# Patient Record
Sex: FEMALE | Race: WHITE | NOT HISPANIC OR LATINO | Employment: FULL TIME | URBAN - METROPOLITAN AREA
[De-identification: names, ages, dates, MRNs, and addresses within clinical notes are randomized per-mention and may not be internally consistent; named-entity substitution may affect disease eponyms.]

---

## 2019-10-02 ENCOUNTER — TRANSCRIBE ORDERS (OUTPATIENT)
Dept: ADMINISTRATIVE | Facility: HOSPITAL | Age: 29
End: 2019-10-02

## 2019-10-02 DIAGNOSIS — Z12.31 VISIT FOR SCREENING MAMMOGRAM: Primary | ICD-10-CM

## 2019-10-22 ENCOUNTER — TELEPHONE (OUTPATIENT)
Dept: MAMMOGRAPHY | Facility: HOSPITAL | Age: 29
End: 2019-10-22

## 2019-10-22 NOTE — TELEPHONE ENCOUNTER
Patient on the mammogram schedule at [facility] on 10/23 for a screening mammogram. The technologist noticed that the patient was 29 year old.  I spoke to the radiologist concerning the patient with a family history of maternal aunt at the age of 38 diagnosed with breast cancer. Per the radiologist the patient does not need a mammogram first.  The patient should speak to a breast specialist, get a risk profile, possible see a .

## 2019-10-23 ENCOUNTER — HOSPITAL ENCOUNTER (OUTPATIENT)
Dept: MAMMOGRAPHY | Facility: HOSPITAL | Age: 29
End: 2019-10-23

## 2020-03-03 ENCOUNTER — OFFICE VISIT (OUTPATIENT)
Dept: FAMILY MEDICINE CLINIC | Facility: CLINIC | Age: 30
End: 2020-03-03

## 2020-03-03 VITALS
DIASTOLIC BLOOD PRESSURE: 68 MMHG | BODY MASS INDEX: 24.62 KG/M2 | HEIGHT: 64 IN | WEIGHT: 144.2 LBS | SYSTOLIC BLOOD PRESSURE: 106 MMHG | HEART RATE: 68 BPM | OXYGEN SATURATION: 100 % | TEMPERATURE: 98.2 F

## 2020-03-03 DIAGNOSIS — J32.9 SINUSITIS, UNSPECIFIED CHRONICITY, UNSPECIFIED LOCATION: ICD-10-CM

## 2020-03-03 DIAGNOSIS — G43.819 OTHER MIGRAINE WITHOUT STATUS MIGRAINOSUS, INTRACTABLE: Primary | ICD-10-CM

## 2020-03-03 DIAGNOSIS — K92.1 BLOOD IN STOOL: ICD-10-CM

## 2020-03-03 DIAGNOSIS — Z00.00 ROUTINE GENERAL MEDICAL EXAMINATION AT A HEALTH CARE FACILITY: ICD-10-CM

## 2020-03-03 PROBLEM — G43.909 MIGRAINE: Status: ACTIVE | Noted: 2020-03-03

## 2020-03-03 PROCEDURE — 99385 PREV VISIT NEW AGE 18-39: CPT | Performed by: NURSE PRACTITIONER

## 2020-03-03 PROCEDURE — 99214 OFFICE O/P EST MOD 30 MIN: CPT | Performed by: NURSE PRACTITIONER

## 2020-03-03 RX ORDER — AMITRIPTYLINE HYDROCHLORIDE 10 MG/1
TABLET, FILM COATED ORAL
COMMUNITY
Start: 2020-02-09

## 2020-03-03 NOTE — PROGRESS NOTES
"Subjective   Salma Pierre is a 29 y.o. female.     History of Present Illness   New patient to me today.    Well Adult Physical: Patient here for a comprehensive physical exam.The patient reports migraines  Do you take any herbs or supplements that were not prescribed by a doctor? no Are you taking calcium supplements? no Are you taking aspirin daily? no    Patient suffers from migraines typically she has 3 out of 4 weeks every month where she is suffering.  Typically amitriptyline 10 mg does work but does not work anymore and has not for the past 3 months.  She is here for possible referral to neurology or any other medication that would possibly work.  In the past she is tried Topamax which did not help.    Pt suffers from frequent sinus infections and requesting a referral to ENT.    Also she has noticed that sometimes when she has a bowel movement she has a considerable amount of blood in the toilet and on the toilet paper this has been happening 1-2 times a month for the past 6 months at least.  She is not had any night sweats or weight loss there is no history of colon cancer in her family that she is aware of.    The following portions of the patient's history were reviewed and updated as appropriate: allergies, current medications, past social history and problem list.    Review of Systems   Neurological: Positive for headaches.   All other systems reviewed and are negative.      Objective   /68 (BP Location: Left arm, Patient Position: Sitting)   Pulse 68   Temp 98.2 °F (36.8 °C)   Ht 162.6 cm (64\")   Wt 65.4 kg (144 lb 3.2 oz)   SpO2 100%   BMI 24.75 kg/m²   Physical Exam   Constitutional: She is oriented to person, place, and time. Vital signs are normal. She appears well-developed and well-nourished. No distress.   HENT:   Head: Normocephalic.   Cardiovascular: Normal rate, regular rhythm and normal heart sounds.   Pulmonary/Chest: Effort normal and breath sounds normal.   Neurological: She " is alert and oriented to person, place, and time. Gait normal.   Psychiatric: She has a normal mood and affect. Her behavior is normal. Judgment and thought content normal.   Vitals reviewed.      Assessment/Plan      Diagnosis Plan   1. Other migraine without status migrainosus, intractable  Erenumab-aooe (AIMOVIG) 70 MG/ML prefilled syringe   2. Sinusitis, unspecified chronicity, unspecified location  Ambulatory Referral to ENT (Otolaryngology)   3. Routine general medical examination at a health care facility     4. Blood in stool  Ambulatory Referral to Gastroenterology     Referral to gastroenterologist for blood in stool referral to ENT for sinus issues.  Flory try Aimovig for her migraine she is to return to the office if there is no improvement.  I verbalized to the patient that she could possibly get a bill for this visit considering she wanted to physical along with these 3 problems patient verbalized understanding and still stated that she wanted a physical today.  Discussed weight, diet and exercise  Follow up after labs      THEODORE Mathews  3/3/2020

## 2020-03-05 ENCOUNTER — PRIOR AUTHORIZATION (OUTPATIENT)
Dept: FAMILY MEDICINE CLINIC | Facility: CLINIC | Age: 30
End: 2020-03-05

## 2020-03-18 ENCOUNTER — TELEPHONE (OUTPATIENT)
Dept: FAMILY MEDICINE CLINIC | Facility: CLINIC | Age: 30
End: 2020-03-18

## 2020-03-18 NOTE — TELEPHONE ENCOUNTER
Pt states she has been home for a couple of days with SOB (she states she has asthma so she feels like this is related), she feels she has a fever, also has congestion and migraine. Her  is in the [employer] and has been sent home from work due to her symptoms, they will not allow him at work. She was asking if she could be tested for COVID-19 but I have asked the screening q's and she does not have cough or been out of country or been in contact with anyone known/being tested for it so I informed her she does not qualify for test but she is curious what August would advise her to do?

## 2020-03-18 NOTE — TELEPHONE ENCOUNTER
Salma is curious if she can get a work excuse quarantine note. Her  will need to take it to his employer since he has been exposed in order for him to stay home with her and not be penalized? please advise.

## 2020-03-18 NOTE — TELEPHONE ENCOUNTER
I would advise her to stay home quarantine herself for 2 weeks and treat it like a virus.  Rest take any medications for any type of symptoms that you have over-the-counter push her fluids and eat well

## 2020-03-20 ENCOUNTER — PREP FOR SURGERY (OUTPATIENT)
Dept: OTHER | Facility: HOSPITAL | Age: 30
End: 2020-03-20

## 2020-03-20 DIAGNOSIS — K62.5 RECTAL BLEEDING: Primary | ICD-10-CM

## 2020-05-20 ENCOUNTER — TELEPHONE (OUTPATIENT)
Dept: FAMILY MEDICINE CLINIC | Facility: CLINIC | Age: 30
End: 2020-05-20

## 2020-05-20 NOTE — TELEPHONE ENCOUNTER
DID THE OFFICE RECEIVE THE APPEAL THAT WAS FAXED OVER ON 05/13 FOR THE PATIENT.    ED  [phone]  REFERENCE #: [id number]

## 2020-05-21 NOTE — TELEPHONE ENCOUNTER
Left message letting patient know we did receive the appeal but requires her signature she was given instruction on what to about about calling the office and coming into office

## 2021-04-16 ENCOUNTER — BULK ORDERING (OUTPATIENT)
Dept: CASE MANAGEMENT | Facility: OTHER | Age: 31
End: 2021-04-16

## 2021-04-16 DIAGNOSIS — Z23 IMMUNIZATION DUE: ICD-10-CM
